# Patient Record
Sex: MALE | Race: WHITE | NOT HISPANIC OR LATINO | Employment: OTHER | URBAN - METROPOLITAN AREA
[De-identification: names, ages, dates, MRNs, and addresses within clinical notes are randomized per-mention and may not be internally consistent; named-entity substitution may affect disease eponyms.]

---

## 2017-03-15 ENCOUNTER — OFFICE VISIT (OUTPATIENT)
Dept: CARDIOLOGY | Facility: CLINIC | Age: 72
End: 2017-03-15

## 2017-03-15 VITALS
SYSTOLIC BLOOD PRESSURE: 99 MMHG | WEIGHT: 230 LBS | HEIGHT: 70 IN | HEART RATE: 68 BPM | DIASTOLIC BLOOD PRESSURE: 63 MMHG | BODY MASS INDEX: 32.93 KG/M2

## 2017-03-15 DIAGNOSIS — I25.10 CHRONIC CORONARY ARTERY DISEASE: ICD-10-CM

## 2017-03-15 DIAGNOSIS — I50.20 SYSTOLIC CONGESTIVE HEART FAILURE, UNSPECIFIED CONGESTIVE HEART FAILURE CHRONICITY: Primary | ICD-10-CM

## 2017-03-15 DIAGNOSIS — Z79.01 ANTICOAGULATED: ICD-10-CM

## 2017-03-15 DIAGNOSIS — E78.5 HYPERLIPIDEMIA, UNSPECIFIED HYPERLIPIDEMIA TYPE: ICD-10-CM

## 2017-03-15 PROCEDURE — 93000 ELECTROCARDIOGRAM COMPLETE: CPT | Performed by: INTERNAL MEDICINE

## 2017-03-15 PROCEDURE — 99213 OFFICE O/P EST LOW 20 MIN: CPT | Performed by: INTERNAL MEDICINE

## 2017-03-15 NOTE — PROGRESS NOTES
Subjective:       Roni Suh is a 72 y.o. male who here for follow up      Hospital follow-up after the recent hospitalization  HPI  72-year-old white male with a recently admitted to the hospital for GI bleed with known history of the coronary artery disease, congestive heart failure as well as hyperlipidemia and chronic anticoagulation is here for the follow-up, patient has been gradually deteriorating over the last several months to several years, still complains of shortness of breath on minimum exertion, denies any chest pains or tightness in chest    Patient evidently had a GI bleed the patient is still on anticoagulation     Problem List Items Addressed This Visit        Cardiovascular and Mediastinum    Congestive heart failure - Primary    Chronic coronary artery disease    Hyperlipidemia       Other    Anticoagulated        Previous treatments/evaluations include: ASA. Cardiac risk factors: advanced age (older than 55 for men, 65 for women).    The following portions of the patient's history were reviewed and updated as appropriate: allergies, current medications, past family history, past medical history, past social history, past surgical history and problem list.    Past Medical History   Diagnosis Date   • Abnormal stress test    • Acute CHF    • Anemia    • Arteriosclerosis of coronary artery    • Asthma    • Atrial fibrillation with RVR    • Chest pain    • Chest pressure    • CKD (chronic kidney disease)    • DDD (degenerative disc disease)    • Diastolic dysfunction    • Dyslipidemia    • Edema    • Esophageal reflux    • H/O angiography    • Heart failure    • Hemoptysis    • HLD (hyperlipidemia)    • HTN (hypertension)    • Hyperuricemia    • Hypogonadism in male    • Hypokalemia    • Obesity    • NAZ (obstructive sleep apnea)    • Pneumonia    • PVD (peripheral vascular disease)    • SOB (shortness of breath)    • Type 2 diabetes mellitus    • Uncontrolled hypertension    • Vitamin D  "deficiency     reports that he has quit smoking. He has never used smokeless tobacco. He reports that he drinks alcohol. He reports that he does not use illicit drugs.  Family History   Problem Relation Age of Onset   • Heart failure Father    • Hearing loss Father        Review of Systems  Constitutional: No wt loss, fever, fatigue  Gastrointestinal: No nausea, abdominal pain  Behavioral/Psych: No insomnia or anxiety   Cardiovascular no chest pains or tightness in chest, shortness of breath on minimum exertion  Objective:       Physical Exam           Physical Exam  Visit Vitals   • BP 99/63 (BP Location: Left arm, Patient Position: Sitting)   • Pulse 68   • Ht 70\" (177.8 cm)   • Wt 230 lb (104 kg)   • BMI 33 kg/m2       General appearance: NAD, conversant , morbidly obese   Eyes: anicteric sclerae, moist conjunctivae; no lid-lag; PERRLA   HENT: Atraumatic; oropharynx clear with moist mucous membranes and no mucosal ulcerations;  normal hard and soft palate   Neck: Trachea midline; FROM, supple, no thyromegaly or lymphadenopathy   Lungs: CTA, with normal respiratory effort and no intercostal retractions   CV: S1-S2 regular, sys murmurs, no rub, no gallop   Abdomen: Soft, non-tender; no masses or HSM   Extremities: No peripheral edema or extremity lymphadenopathy  Skin: Normal temperature, turgor and texture; no rash, ulcers or subcutaneous nodules   Psych: Appropriate affect, alert and oriented to person, place and time           Cardiographics  @  ECG 12 Lead  Date/Time: 3/15/2017 9:45 AM  Performed by: RON NARVAEZ  Authorized by: RON NARVAEZ   Comparison: compared with previous ECG   Similar to previous ECG  Rhythm: sinus rhythm  Clinical impression: non-specific ECG            Echocardiogram:        Current Outpatient Prescriptions:   •  albuterol (PROVENTIL) (2.5 MG/3ML) 0.083% nebulizer solution, 4 (four) times a day., Disp: , Rfl:   •  apixaban (ELIQUIS) 5 MG tablet tablet, Take  by " mouth., Disp: , Rfl:   •  aspirin 81 MG tablet, Take  by mouth daily., Disp: , Rfl:   •  budesonide-formoterol (SYMBICORT) 160-4.5 MCG/ACT inhaler, 2 (two) times a day., Disp: , Rfl:   •  ferrous sulfate 325 (65 FE) MG tablet, Take 325 mg by mouth 2 (Two) Times a Day., Disp: , Rfl:   •  levETIRAcetam (KEPPRA) 250 MG tablet, Take 250 mg by mouth 2 (two) times a day., Disp: , Rfl:   •  levothyroxine (SYNTHROID, LEVOTHROID) 50 MCG tablet, Take  by mouth., Disp: , Rfl:   •  metolazone (ZAROXOLYN) 5 MG tablet, Take 5 mg by mouth daily., Disp: , Rfl:   •  metoprolol succinate XL (TOPROL-XL) 25 MG 24 hr tablet, Take 12.5 mg by mouth 2 (Two) Times a Day., Disp: , Rfl:   •  montelukast (SINGULAIR) 10 MG tablet, Take  by mouth daily., Disp: , Rfl:   •  morphine (GALO) 30 MG 24 hr capsule, Take  by mouth daily., Disp: , Rfl:   •  nitroglycerin (NITROSTAT) 0.4 MG SL tablet, Place  under the tongue., Disp: , Rfl:   •  potassium chloride (MICRO-K) 10 MEQ CR capsule, Take  by mouth daily., Disp: , Rfl:   •  terazosin (HYTRIN) 10 MG capsule, Take  by mouth., Disp: , Rfl:   •  tiotropium (SPIRIVA) 18 MCG per inhalation capsule, Spiriva HandiHaler 18 MCG Inhalation Capsule; Patient Sig: Spiriva HandiHaler 18 MCG Inhalation Capsule INHALE ONE DOSE EVERY DAY; 30; 0; 05-Sep-2013; Active, Disp: , Rfl:   •  torsemide (DEMADEX) 20 MG tablet, Take  by mouth 2 (two) times a day., Disp: , Rfl:   •  vitamin D (ERGOCALCIFEROL) 79820 UNITS capsule capsule, Take  by mouth., Disp: , Rfl:   •  zonisamide (ZONEGRAN) 100 MG capsule, Take  by mouth., Disp: , Rfl:    Assessment:        Patient Active Problem List   Diagnosis   • Vitamin D deficiency   • Abnormal thallium stress test   • Type 2 diabetes mellitus   • Breath shortness   • Acute renal failure   • Congestive heart failure   • Chronic coronary artery disease   • Hyperuricemia   • Atrial fibrillation with rapid ventricular response   • Benign prostatic hyperplasia   • Chest pain   • Chest  tightness   • Chronic obstructive pulmonary disease   • Narrowing of intervertebral disc space   • Dyslipidemia   • Edema   • Fatigue   • Relapsing fever   • Right-sided heart failure   • Hypertension   • Hyperlipidemia   • Hypogonadism in male   • Hypokalemia   • Hypothyroidism   • Obstructive sleep apnea syndrome   • Peripheral arterial occlusive disease   • Peripheral vascular disease   • Paroxysmal atrial fibrillation               Plan:       72-year-old male with a significant morbid medical conditions has been slowly deteriorating, has a history of atrial fibrillation recent GI bleed on chronic anticoagulation continues to complains of shortness of breath on exertion but no different than before    Anticoagulation has not been discontinued by the primary-care physician on the gastroenterologist    Coronary artery disease appears to be stable with no angina pectoris    CHF appears to be well compensated    Risk of the hyperlipidemia, importance of the treatment has been explained    Pros and cons of the statins has been explained    Regular blood workup as well as side effects including the liver failure, myelopathy death has been explained            ICD-10-CM ICD-9-CM   1. Systolic congestive heart failure, unspecified congestive heart failure chronicity I50.20 428.20     428.0   2. Chronic coronary artery disease I25.10 414.00   3. Hyperlipidemia, unspecified hyperlipidemia type E78.5 272.4     Pt recently had gi bleed     COUNSELING:    Roni Poseyeling was given to patient for the following topics: diagnostic results, risk factor reductions, impressions, risks and benefits of treatment options and importance of treatment compliance .       SMOKING COUNSELING:    Counseling given: Not Answered      EMR Dragon/Transcription disclaimer:   Much of this encounter note is an electronic transcription/translation of spoken language to printed text. The electronic translation of spoken language may  permit erroneous, or at times, nonsensical words or phrases to be inadvertently transcribed; Although I have reviewed the note for such errors, some may still exist.

## 2017-12-13 ENCOUNTER — TELEPHONE (OUTPATIENT)
Dept: CARDIOLOGY | Facility: CLINIC | Age: 72
End: 2017-12-13

## 2017-12-13 NOTE — TELEPHONE ENCOUNTER
----- Message from Val Danielle sent at 12/13/2017  2:13 PM EST -----   IS CALLING -[phone]    RETURNED CALL -TESSIE